# Patient Record
Sex: FEMALE | Race: BLACK OR AFRICAN AMERICAN | NOT HISPANIC OR LATINO | Employment: FULL TIME | URBAN - METROPOLITAN AREA
[De-identification: names, ages, dates, MRNs, and addresses within clinical notes are randomized per-mention and may not be internally consistent; named-entity substitution may affect disease eponyms.]

---

## 2024-06-19 ENCOUNTER — OFFICE VISIT (OUTPATIENT)
Dept: BARIATRICS | Facility: CLINIC | Age: 34
End: 2024-06-19
Payer: COMMERCIAL

## 2024-06-19 VITALS
SYSTOLIC BLOOD PRESSURE: 120 MMHG | WEIGHT: 284 LBS | HEART RATE: 70 BPM | HEIGHT: 62 IN | DIASTOLIC BLOOD PRESSURE: 88 MMHG | BODY MASS INDEX: 52.26 KG/M2

## 2024-06-19 DIAGNOSIS — E66.01 CLASS 3 SEVERE OBESITY DUE TO EXCESS CALORIES WITH SERIOUS COMORBIDITY AND BODY MASS INDEX (BMI) OF 50.0 TO 59.9 IN ADULT (HCC): Primary | ICD-10-CM

## 2024-06-19 PROBLEM — E66.813 CLASS 3 SEVERE OBESITY DUE TO EXCESS CALORIES WITH SERIOUS COMORBIDITY AND BODY MASS INDEX (BMI) OF 50.0 TO 59.9 IN ADULT (HCC): Status: ACTIVE | Noted: 2024-06-19

## 2024-06-19 PROCEDURE — 99204 OFFICE O/P NEW MOD 45 MIN: CPT | Performed by: INTERNAL MEDICINE

## 2024-06-19 RX ORDER — METFORMIN HYDROCHLORIDE 500 MG/1
TABLET, EXTENDED RELEASE ORAL
Qty: 60 TABLET | Refills: 3 | Status: CANCELLED | OUTPATIENT
Start: 2024-06-19

## 2024-06-19 NOTE — ASSESSMENT & PLAN NOTE
Nutrition: Explained the role of being in a calorie deficit to achieve weight loss  -Avoid liquid calories  -Patient reports that she has decreased her snacking and changed her diet with the help of a nutritionist and is  focusing on protein ; encouraged her to continue current efforts in balancing meals but start tracking her current intake  -Patient encouraged to meet with dietitian for nutrition prescription  Patient reports she has to taste foods in small amounts throughout the day as part of her job as a     Physical Activity: Discussed [employer] fitness program with her and encourage patient to incorporate 2 to 3 days of strength training even with resistance bands      Sleep -- STOP- BANG- 3/8 ; 9 hours     Behavioral/Stress: Start tracking current intake using MFP, so adjustments can be made by the dietitian.    Antiobesity Medications/Medical --class III obesity BMI of 52 will be checking labs to evaluate for comorbidities  -Patient has had prior history of PCOS and reports a prior history of prediabetes when labs were checked in 2022  -Discussed initiating metformin as first-line therapy for prediabetes and PCOS after reviewing labs  -Patient primarily interested in injectable medications as she reports she has read Mounjaro is effective for PCOS  -Discussed with patient that medication such as Ozempic and Mounjaro are only approved with the prior diagnosis of diabetes which we will be evaluating for through labs  -Discussed goals oral medications such as Qsymia phentermine (current blood pressure 120/88, heart rate 70 and topiramate)  -Discussed Contrave and its components bupropion and naltrexone no obvious contraindication  -We decided that patient will work with the dietitian for 8 weeks and a calorie deficit diet along with metformin and decisions regarding injectables will be made at follow-up visits  -We will review the labs first prior to prescribing metformin  -Also encouraged the patient to find  a primary care physician in the area to help address underlying concerns and primary care needs

## 2024-06-19 NOTE — PROGRESS NOTES
Assessment/Plan     Austyn Willis is 33 y.o. year old female  who comes in for consultation for assistance with weight management.     - Discussed options of [facility]-Intensive Lifestyle Intervention Program, Very Low Calorie Diet-VLCD, and Conservative Program and the role of weight loss medications.  - Patient is interested in pursuing Conservative Program    Class 3 severe obesity due to excess calories with serious comorbidity and body mass index (BMI) of 50.0 to 59.9 in adult (HCC)  Nutrition: Explained the role of being in a calorie deficit to achieve weight loss  -Avoid liquid calories  -Patient reports that she has decreased her snacking and changed her diet with the help of a nutritionist and is  focusing on protein ; encouraged her to continue current efforts in balancing meals but start tracking her current intake  -Patient encouraged to meet with dietitian for nutrition prescription  Patient reports she has to taste foods in small amounts throughout the day as part of her job as a     Physical Activity: Discussed [employer] fitness program with her and encourage patient to incorporate 2 to 3 days of strength training even with resistance bands      Sleep -- STOP- BANG- 3/8 ; 9 hours     Behavioral/Stress: Start tracking current intake using MFP, so adjustments can be made by the dietitian.    Antiobesity Medications/Medical --class III obesity BMI of 52 will be checking labs to evaluate for comorbidities  -Patient has had prior history of PCOS and reports a prior history of prediabetes when labs were checked in 2022  -Discussed initiating metformin as first-line therapy for prediabetes and PCOS after reviewing labs  -Patient primarily interested in injectable medications as she reports she has read Mounjaro is effective for PCOS  -Discussed with patient that medication such as Ozempic and Mounjaro are only approved with the prior diagnosis of diabetes which we will be evaluating for through  labs  -Discussed goals oral medications such as Qsymia phentermine (current blood pressure 120/88, heart rate 70 and topiramate)  -Discussed Contrave and its components bupropion and naltrexone no obvious contraindication  -We decided that patient will work with the dietitian for 8 weeks and a calorie deficit diet along with metformin and decisions regarding injectables will be made at follow-up visits  -We will review the labs first prior to prescribing metformin  -Also encouraged the patient to find a primary care physician in the area to help address underlying concerns and primary care needs    Austyn was seen today for consult.    Diagnoses and all orders for this visit:    Class 3 severe obesity due to excess calories with serious comorbidity and body mass index (BMI) of 50.0 to 59.9 in adult (HCC)  -     CBC and differential; Future  -     Comprehensive metabolic panel; Future  -     Hemoglobin A1C; Future  -     Insulin, fasting; Future  -     Lipid panel; Future  -     T3, free; Future  -     TSH, 3rd generation with Free T4 reflex; Future          -In addition, please follow general recommendations below.          Return visit:  6-8 weeks        General Lifestyle recommendations:    Nutrition   -Avoid skipping meals. Avoid sugary beverages. At least 64oz of water daily.  Limit processed food, refined sugars and grain. Encourage  healthy choices for meals and snacks   -Focus on protein goals and non starchy fiber rich vegetables for satiety effect and to help support a calorie deficit.   - Emphasize portion control, well balanced macronutrient's (protein, carbohydrate, fat using MyPlate method )and adequate protein with each meal/snacks and distributing calories equally throughout the day along with.   -Advise starting the day with a protein breakfast   Behavioral/Stress   Food log via alex or provided paper log (alex options include www.[employer]nesspal.com, sparkpeople.com, loseit.com, calorieking.com,  "baritastic). Encouraged mindful eating. Be sure to set aside time to eat, eat slowly, and savor your food. Consider meditation apps and/or taking a few minutes of mindfulness every AM. Understand the role of regarding the role of stress hormone cortisol in promoting weight gain and visceral fat accumulation. Weigh daily or atleast 2-3 times/ week  Physical Activity   Increase physical activity by 10 minutes daily. Gradually increase physical activity to a goal of 5 days per week for 30 minutes of MODERATE intensity ( should be able to pass the \"talk test\" but should not be able to sing. Target 150-300 minutes  PLUS 2 days per week of FULL BODY resistance training. Progression will be addressed at follow up visits. Encouraged contemplation regarding establishing a daily physical activity routine  - Resistance training along with increase protein intake is important to maintain and enhance metabolism  Sleep   Encourage sleep hygiene and importance of having adequate sleep duration at least > 6 hours to support response in weight loss efforts    Handouts provided :  [facility]  MyPlate and food quality  Food log resources, phone alex or paper journal  Antiobesity medications options     - Discussed at length and the role of weight loss medications and medication options   - Explained the importance of making lifestyle changes in addition to starting any anti-obesity medications if the  patient chooses.  -  Initial weight loss goal of 5-10% weight loss for improved health  - Weight loss can improve patient's co-morbid conditions and/or prevent weight-related complications.  - Weight is not at goal and patient has been unable to achieve a meaningful weight loss above 5% using various programs and tools for more than 6 months    Austyn was seen today for consult.    Diagnoses and all orders for this visit:    Class 3 severe obesity due to excess calories with serious comorbidity and body mass index " (BMI) of 50.0 to 59.9 in adult (HCC)  -     CBC and differential; Future  -     Comprehensive metabolic panel; Future  -     Hemoglobin A1C; Future  -     Insulin, fasting; Future  -     Lipid panel; Future  -     T3, free; Future  -     TSH, 3rd generation with Free T4 reflex; Future                      Total time spent reviewing chart, interviewing patient, examining patient, discussing plan, answering all questions, and documentin min, with >50% face-to-face time spent counseling patient on nonsurgical interventions for the treatment of excess weight. Discussed in detail nonsurgical options including intensive lifestyle intervention program, very low-calorie diet program and conservative program.  Discussed the role of weight loss medications.  Counseled patient on diet behavior and exercise modification for weight loss.            Lifestyle questionnaire     Meal preps  Diet recall:  B: 2-3 eggs English muffin or bagel or pancake  L: Chicken with corn roast chicken and kidney beans ground lamb brown rice  D: veggie mix rice and beans with meat, P/V/S  S: Greek yogurt with breakfast berries and granola, cheat snacks peanut butter cookies chocolate trail mix  Eating out vs cooking at home- none    Beverages  Water--64 +     Caffeine/tea-- iced tea  sweetened   SSB- apple juice 24 oz    Alcohol: no  Smoking: no  Drug use: smoke 2-3 x/ week    Physical Activity -- new job, [employer] , walking 2-3 miles, previously was on her feet 10-12 hours    Sleep -- STOP- BANG- 3/8 ; 9 hours     Occupation- personal  irregular hours, 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  -Patient recently moved from [place]    Start date: 6/19/24  Intial weight : 284 lbs  Intial BMI: 51.94 kg/m2  Obesity Class: Above 40- Obesity Class III  Goal weight: 225 lbs          History of present illness       Fam hx of obesity- none    Food behaviorssnacking/grazing and struggle controlling portions    Previous Weight loss medications/Weight loss attempts:   3 months ago started to change her diet but is still gaining weight despite stopping snacking-    Patient reports and other history-    Self referred    Wt Readings from Last 20 Encounters:   06/19/24 129 kg (284 lb)           Medication considerations/contraindications     -Patient denies personal history of pancreatitis. Patient also denies personal and family history of medullary thyroid cancer and multiple endocrine neoplasia type 2 (MEN 2 tumor). -Patient denies any history of kidney stones, seizures, or glaucoma, diabetic retinopathy, gall bladder disease, hyperthyroidism.  -Denies Hx of CAD, PAD, palpitations, arrhythmia.   -Denies uncontrolled anxiety or depression, suicidal behavior or thinking , insomnia or sleep disturbance.         Past medical history/past surgical history       Previous notes and records have been reviewed.    The following portions of the patient's history were reviewed and updated as appropriate: allergies, current medications, past family history, past medical history, past social history, past surgical history, and problem list.    Past Medical History:   Diagnosis Date    Hypothyroidism     PCOS (polycystic ovarian syndrome)     Insulin  "resistant         History reviewed. No pertinent surgical history.          History reviewed. No pertinent family history.         Objective     /88 (BP Location: Left arm, Patient Position: Sitting, Cuff Size: Large)   Pulse 70   Ht 5' 2\" (1.575 m)   Wt 129 kg (284 lb)   BMI 51.94 kg/m²       Review of Systems    Physical Exam  Vitals and nursing note reviewed.   Constitutional:       Appearance: Normal appearance.   HENT:      Head: Normocephalic.   Neck:      Thyroid: No thyroid mass, thyromegaly or thyroid tenderness.   Cardiovascular:      Rate and Rhythm: Normal rate and regular rhythm.      Pulses: Normal pulses.      Heart sounds: Normal heart sounds.   Pulmonary:      Effort: Pulmonary effort is normal.      Breath sounds: Normal breath sounds.   Abdominal:      General: Bowel sounds are normal.      Palpations: Abdomen is soft.   Musculoskeletal:      Cervical back: Normal range of motion and neck supple. No tenderness.      Right lower leg: No edema.      Left lower leg: No edema.   Skin:     General: Skin is warm and dry.   Neurological:      General: No focal deficit present.      Mental Status: She is alert and oriented to person, place, and time.   Psychiatric:         Mood and Affect: Mood normal.         Behavior: Behavior normal.         Thought Content: Thought content normal.         Judgment: Judgment normal.            Medications     No current outpatient medications on file.           Labs and imaging     Recent labs and imaging have been personally reviewed.  No results found for: \"WBC\", \"HGB\", \"HCT\", \"MCV\", \"PLT\"  No results found for: \"NA\", \"SODIUM\", \"K\", \"CL\", \"CO2\", \"ANIONGAP\", \"AGAP\", \"BUN\", \"CREATININE\", \"GLUC\", \"GLUF\", \"CALCIUM\", \"AST\", \"ALT\", \"ALKPHOS\", \"PROT\", \"TP\", \"BILITOT\", \"TBILI\", \"EGFR\"  No results found for: \"HGBA1C\"  No results found for: \"BZX6TVXETMDD\", \"TSH\"  No results found for: \"CHOLESTEROL\"  No results found for: \"HDL\"  No results found for: \"TRIG\"  No " "results found for: \"LDLCALC\"                   "

## 2024-06-21 LAB — HBA1C MFR BLD HPLC: 5.9 %

## 2024-07-02 NOTE — PROGRESS NOTES
Weight Management Medical Nutrition Assessment  Austyn is here for Meal Planning. Current Wt 284.9#. Hx of Prediabetes, Insulin resistant PCOS, HS. Waiting on lab results to determine HGA1C and possible prescription for Metformin or possible Semiglutide. Excess calories per patient dietary recall from carbohydrates and sweetened beverages. Due to working long hours as a  7 days a week, experiences long stretches without eating in the day. Discussed importance of consistent intake of carbohydrates and protein. Developed a conservative calorie plan as patient is not interested in tight calorie restriction at this time. Will follow up with RD.  Dislikes: avoids pork    Goals:   1) Reducing sweetened beverage intake from 24 ounces a day to 16 ounces a day.  2) Adding in 2 snacks( balanced with carbohydrates and protein) a day or a balanced lunch meal cut in half between Breakfast and Dinner  3) Looking into strength training program to possibly commence 1-2 days a week    Patient seen by Medical Provider in past 6 months:  yes  Requested to schedule appointment with Medical Provider: No      Anthropometric Measurements  Start Weight (#): 284#(6/19/24)  Current Weight (#): 294.9# (07/08/24)  TBW % Change from start weight:n/a  Ideal Body Weight (#):110#  Goal Weight (#):230#  Highest: 28$#  Lowest: 240#    Weight Loss History  Previous weight loss attempts: Hormones supplementation estrogen.     Food and Nutrition Related History  Wake up: 7-7:30 am   Bed Time:10:30-11 pm    Food Recall- Will go very long stretches without eating  Breakfast: 10-10:30 am no appetite: Greek yogurt with 1/2 cup granola 1/2-3/4 and berries or boiled eggs with smoked salmon. Egg and cheese or bagel .     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 --steps not assessed  Physical Activity -- new job, [employer] , walking 2-3 miles, previously was on her feet 10-12 hours  Frequency:infrequently  Physical limitations/barriers to exercise: busy work schedule    Estimated Needs  Energy  SECA: BMR:n/a      X 1.3 -1000 =  Reggie Simons Energy Needs: BMR : 1956     1-2# loss weekly sedentary:  6891-8991             1-2# loss weekly lightly active:3926-3783  Maintenance calories for sedentary activity level: 2348  Protein:60-75      (1.2-1.5g/kg IBW)  Fluid: 58     (35mL/kg IBW)    Nutrition Diagnosis  Yes;    Overweight/obesity  related to Excess energy intake as evidenced by  BMI more than normative standard for age and sex (obesity-grade III 40+)       Nutrition Intervention    Nutrition Prescription--Pt comfortable with conservative loss  Calories:9432-2155 kcal/day  Protein:75  Fluid:58 ounces    Meal Plan (Bebeto/Pro/Carb)  Breakfast: 300-400kcal/20-25 g protein  Snack:  Lunch: 300-400 kcal/25 g protein OR 2 snacks  Snack:  Dinner: 600 kcal/30-35 g protein  Snack:    Nutrition Education:    Calorie controlled menu  Lean protein food choices  Healthy snack options  Food journaling tips      Nutrition Counseling:  Strategies: meal planning, portion sizes, healthy snack choices, hydration, fiber intake, protein intake, exercise, food journal      Monitoring and Evaluation:  Evaluation criteria:  Energy Intake  Meet protein needs  Maintain adequate hydration  Monitor weekly weight  Meal planning/preparation  Food journal   Decreased portions at mealtimes and snacks  Physical activity     Barriers to learning:none  Readiness to change: Preparation:  (Getting ready to change)    Comprehension: excellent  Expected Compliance: good

## 2024-07-08 ENCOUNTER — CLINICAL SUPPORT (OUTPATIENT)
Dept: BARIATRICS | Facility: CLINIC | Age: 34
End: 2024-07-08

## 2024-07-08 VITALS — HEIGHT: 62 IN | BODY MASS INDEX: 52.43 KG/M2 | WEIGHT: 284.9 LBS

## 2024-07-08 DIAGNOSIS — R63.5 ABNORMAL WEIGHT GAIN: Primary | ICD-10-CM

## 2024-07-08 PROCEDURE — WMDI30

## 2024-07-08 PROCEDURE — RECHECK

## 2024-07-15 ENCOUNTER — TELEPHONE (OUTPATIENT)
Age: 34
End: 2024-07-15

## 2024-07-15 NOTE — TELEPHONE ENCOUNTER
Patient called in to check on whether her labs were received by Dr Solis, she is requesting someone call her to go over them and decide if she is able to go on WM meds.  Please call her at , thanks

## 2024-08-14 ENCOUNTER — OFFICE VISIT (OUTPATIENT)
Dept: BARIATRICS | Facility: CLINIC | Age: 34
End: 2024-08-14
Payer: COMMERCIAL

## 2024-08-14 VITALS
BODY MASS INDEX: 50.79 KG/M2 | SYSTOLIC BLOOD PRESSURE: 110 MMHG | WEIGHT: 276 LBS | HEIGHT: 62 IN | HEART RATE: 64 BPM | DIASTOLIC BLOOD PRESSURE: 78 MMHG

## 2024-08-14 DIAGNOSIS — E66.01 CLASS 3 SEVERE OBESITY DUE TO EXCESS CALORIES WITH SERIOUS COMORBIDITY AND BODY MASS INDEX (BMI) OF 50.0 TO 59.9 IN ADULT (HCC): Primary | ICD-10-CM

## 2024-08-14 PROCEDURE — 99215 OFFICE O/P EST HI 40 MIN: CPT | Performed by: INTERNAL MEDICINE

## 2024-08-14 RX ORDER — METFORMIN HCL 500 MG
TABLET, EXTENDED RELEASE 24 HR ORAL
Qty: 60 TABLET | Refills: 3 | Status: SHIPPED | OUTPATIENT
Start: 2024-08-14

## 2024-08-14 NOTE — ASSESSMENT & PLAN NOTE
"Congratulated patient on making significant lifestyle changes with meal prepping, logging in my fitness pal and prioritizing protein that have resulted in an 8 pound weight loss.  She reports that getting to her protein targets has been the hardest part.  She reports that cravings are improved and does not want to implement any processed foods such as meal replacement shakes given her occupation as a .  She is trying to implement resistance training.  She does walk 2 to 3 miles at work.  She will follow nutrition prescription provided by dietitian calories:0856-7959 kcal/day  Protein:75  Fluid:58 ounces  Class III obesity with hyperlipidemia and prediabetes  Patient has not yet inquired with her insurance regarding coverage for injectable medications but states that she is not sure if she wants to try injections.  She reports that she is not looking for rapid weight loss but more slow and sustainable.  Patient reports that she is looking for \"30 to 50 pounds of weight loss\".  Given her recent blood work showing prediabetes with a hemoglobin A1c of 5.9, we decided to start with metformin at 500 mg twice a day.  We could consider agents such as a combination of phentermine and Topamax to simulate Qsymia or bupropion and naltrexone to simulate Contrave at future visits as she has no contraindications.   "

## 2024-08-14 NOTE — PROGRESS NOTES
"  Program: [facility]    Assessment/Plan     Austyn Willis  is a 33 y.o. female with  returns to follow up  for treatment of excess body weight and associated risk factors/co-morbidities.     Class 3 severe obesity due to excess calories with serious comorbidity and body mass index (BMI) of 50.0 to 59.9 in adult ([facility])  Congratulated patient on making significant lifestyle changes with meal prepping, logging in my fitness pal and prioritizing protein that have resulted in an 8 pound weight loss.  She reports that getting to her protein targets has been the hardest part.  She reports that cravings are improved and does not want to implement any processed foods such as meal replacement shakes given her occupation as a .  She is trying to implement resistance training.  She does walk 2 to 3 miles at work.  She will follow nutrition prescription provided by dietitian calories:9519-6120 kcal/day  Protein:75  Fluid:58 ounces  Class III obesity with hyperlipidemia and prediabetes  Patient has not yet inquired with her insurance regarding coverage for injectable medications but states that she is not sure if she wants to try injections.  She reports that she is not looking for rapid weight loss but more slow and sustainable.  Patient reports that she is looking for \"30 to 50 pounds of weight loss\".  Given her recent blood work showing prediabetes with a hemoglobin A1c of 5.9, we decided to start with metformin at 500 mg twice a day.  We could consider agents such as a combination of phentermine and Topamax to simulate Qsymia or bupropion and naltrexone to simulate Contrave at future visits as she has no contraindications.        Most recent notes and records were reviewed.         Return visit:  2-3 months     Nutrition   Do not skip meals. Avoid sugary beverages. At least 64oz of water daily.    Behavioral/Stress   Food log via alex or provided paper log (alex options include www.broadbandchoices.com, sparkpeople.com, " "loseit.com, Koru.com, [employer]). Encouraged mindful eating    Physical Activity  Increase physical activity by 10 minutes daily. Gradually increase physical activity to a goal of 5 days per week for 30 minutes of MODERATE intensity ( ( should be able to pass the \"talk test\" but should not be able to sing.  target 150-300 minutes  PLUS 2-3 days per week of FULL BODY resistance training. Progression will be addressed at follow up visits. Encouraged planning regarding establishing physical activity routine    Sleep  Provided sleep hygiene counseling and importance of having adequate sleep duration          Eric was seen today for follow-up.    Diagnoses and all orders for this visit:    Class 3 severe obesity due to excess calories with serious comorbidity and body mass index (BMI) of 50.0 to 59.9 in adult ([facility])  -     metFORMIN (GLUCOPHAGE-XR) 500 mg 24 hr tablet; Start with 500 mg orally once daily with the evening meal increase in two weeks to 1000 mg (two tablets) if tolerated                Total time spent reviewing chart, interviewing patient, examining patient, discussing plan, answering all questions, and documentin minutes with >50% face-to-face time with the patient.            Weight trajectory     Wt Readings from Last 20 Encounters:   24 125 kg (276 lb)   24 129 kg (284 lb 14.4 oz)   24 129 kg (284 lb)               Weight/ Lifestyle history/HPI     Patient reports   Calories:1094-9964 kcal/day  Protein:75  Fluid:58 ounces  Has been incorporating more protein  Diet recall:  B: frittatas   L: Chicken salad wrap chickpea quinoa salad and turkey burger  D: salmon rice baked chicken   S: apples PB granola Greek yogurt blueberries with coconut  Eating out vs cooking at home- none     Beverages  Water--64 +     Caffeine/tea-- iced tea  sweetened   SSB- apple juice 24 oz     Alcohol: no  Smoking: no  Drug use: smoke 2-3 x/ week     Physical Activity -- new job, [employer] , " "walking 2-3 miles, previously was on her feet 10-12 hours     Sleep -- STOP- BANG- 3/8 ; 9 hours      Occupation- personal  irregular hours, 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     Psycho social- lives by herself     Gyneac (Menopausal status/periods/contraception)- never had a period , and carries a diagnosis of PCOS since age 16,     Start date: 6/19/24  Intial weight : 284 lbs  Intial BMI: 51.94 kg/m2  Obesity Class: Above 40- Obesity Class III  Goal weight: 225 lbs    Current Weight on 8/14/2024: 276 lbs  Current BMI : 50 kg/m2 Above 40- Obesity Class III  Difference: -8 lbs      Anti obesity Medications/ Medical---    Weight loss medication and dose: None  Date initiated:               Objective         The following portions of the patient's history were reviewed and updated as appropriate: allergies, current medications, past family history, past medical history, past social history, past surgical history, and problem list.      /78   Pulse 64   Ht 5' 2\" (1.575 m)   Wt 125 kg (276 lb)   BMI 50.48 kg/m²             Review of Systems   Constitutional:  Negative for fatigue.   HENT:  Negative for sore throat.    Respiratory:  Negative for cough and shortness of breath.    Cardiovascular:  Negative for chest pain, palpitations and leg swelling.   Gastrointestinal:  Negative for abdominal pain, constipation, diarrhea and nausea.   Genitourinary:  Negative for dysuria.   Musculoskeletal:  Negative for arthralgias and back pain.   Skin:  Negative for rash.   Neurological:  Negative for headaches.   Psychiatric/Behavioral:  Negative for dysphoric mood. The patient is not nervous/anxious.          Physical Exam  Vitals and nursing note reviewed.   Constitutional:       Appearance: Normal appearance.   HENT:      Head: Normocephalic.   Pulmonary:      Effort: Pulmonary effort is normal.   Neurological:      General: No focal deficit present.      Mental Status: She is alert and " "oriented to person, place, and time.   Psychiatric:         Mood and Affect: Mood normal.         Behavior: Behavior normal.         Thought Content: Thought content normal.         Judgment: Judgment normal.          Current medications       Current Outpatient Medications:     metFORMIN (GLUCOPHAGE-XR) 500 mg 24 hr tablet, Start with 500 mg orally once daily with the evening meal increase in two weeks to 1000 mg (two tablets) if tolerated, Disp: 60 tablet, Rfl: 3         Labs     Most recent labs reviewed   No results found for: \"NA\", \"SODIUM\", \"K\", \"CL\", \"CO2\", \"ANIONGAP\", \"AGAP\", \"BUN\", \"CREATININE\", \"GLUC\", \"GLUF\", \"CALCIUM\", \"AST\", \"ALT\", \"ALKPHOS\", \"PROT\", \"TP\", \"BILITOT\", \"TBILI\", \"EGFR\"  Lab Results   Component Value Date    Psychiatric 5.9 06/21/2024     No results found for: \"OGW1BFCOKPTM\", \"TSH\"  No results found for: \"CHOLESTEROL\"  No results found for: \"HDL\"  No results found for: \"TRIG\"  No results found for: \"LDLCALC\"  No results found for: \"VITD\"  No components found for: \"FASTINS\"   "

## 2024-12-10 DIAGNOSIS — E66.01 CLASS 3 SEVERE OBESITY DUE TO EXCESS CALORIES WITH SERIOUS COMORBIDITY AND BODY MASS INDEX (BMI) OF 50.0 TO 59.9 IN ADULT (HCC): ICD-10-CM

## 2024-12-10 DIAGNOSIS — E66.813 CLASS 3 SEVERE OBESITY DUE TO EXCESS CALORIES WITH SERIOUS COMORBIDITY AND BODY MASS INDEX (BMI) OF 50.0 TO 59.9 IN ADULT (HCC): ICD-10-CM

## 2024-12-10 RX ORDER — METFORMIN HYDROCHLORIDE 500 MG/1
TABLET, EXTENDED RELEASE ORAL
Qty: 180 TABLET | Refills: 3 | Status: SHIPPED | OUTPATIENT
Start: 2024-12-10

## 2024-12-10 RX ORDER — METFORMIN HYDROCHLORIDE 500 MG/1
TABLET, EXTENDED RELEASE ORAL
Qty: 60 TABLET | Refills: 3 | Status: CANCELLED | OUTPATIENT
Start: 2024-12-10

## 2025-03-10 ENCOUNTER — TELEPHONE (OUTPATIENT)
Dept: BARIATRICS | Facility: CLINIC | Age: 35
End: 2025-03-10

## 2025-03-10 DIAGNOSIS — E66.813 CLASS 3 SEVERE OBESITY DUE TO EXCESS CALORIES WITH SERIOUS COMORBIDITY AND BODY MASS INDEX (BMI) OF 50.0 TO 59.9 IN ADULT (HCC): ICD-10-CM

## 2025-03-10 DIAGNOSIS — E66.01 CLASS 3 SEVERE OBESITY DUE TO EXCESS CALORIES WITH SERIOUS COMORBIDITY AND BODY MASS INDEX (BMI) OF 50.0 TO 59.9 IN ADULT (HCC): ICD-10-CM

## 2025-03-10 RX ORDER — METFORMIN HYDROCHLORIDE 500 MG/1
TABLET, EXTENDED RELEASE ORAL
Qty: 60 TABLET | Refills: 0 | Status: SHIPPED | OUTPATIENT
Start: 2025-03-10

## 2025-03-10 NOTE — TELEPHONE ENCOUNTER
Fax received from [facility] requesting  Refill.    Quantity: 60  Refills: 3

## 2025-03-26 ENCOUNTER — TELEPHONE (OUTPATIENT)
Dept: BARIATRICS | Facility: CLINIC | Age: 35
End: 2025-03-26

## 2025-03-26 NOTE — TELEPHONE ENCOUNTER
Called several times to have the patient call and set up a f/up appt. W/  no answer so I  Lmsg, patient had several appt.'s in which she cxd and no showed.

## 2025-06-24 ENCOUNTER — APPOINTMENT (OUTPATIENT)
Dept: URBAN - METROPOLITAN AREA CLINIC 374 | Facility: CLINIC | Age: 35
Setting detail: DERMATOLOGY
End: 2025-06-24

## 2025-06-24 DIAGNOSIS — L21.8 OTHER SEBORRHEIC DERMATITIS: ICD-10-CM | Status: STABLE

## 2025-06-24 PROCEDURE — ? ADDITIONAL NOTES

## 2025-06-24 PROCEDURE — ? DEFER

## 2025-06-24 PROCEDURE — ? COUNSELING

## 2025-06-24 ASSESSMENT — LOCATION ZONE DERM: LOCATION ZONE: SCALP

## 2025-06-24 ASSESSMENT — LOCATION DETAILED DESCRIPTION DERM: LOCATION DETAILED: LEFT MEDIAL FRONTAL SCALP

## 2025-06-24 ASSESSMENT — LOCATION SIMPLE DESCRIPTION DERM: LOCATION SIMPLE: LEFT SCALP

## 2025-06-24 ASSESSMENT — SEVERITY ASSESSMENT: HOW SEVERE IS THIS PATIENT'S CONDITION?: MILD

## 2025-06-24 NOTE — HPI: HAIR LOSS
How Did The Hair Loss Occur?: gradual in onset
How Severe Is Your Hair Loss?: mild
Additional History: Patient has PCOS

## 2025-06-24 NOTE — PROCEDURE: ADDITIONAL NOTES
Render Risk Assessment In Note?: no
Detail Level: Simple
Additional Notes: Pt's main concern is hair thinning at the vertex of the scalp. However, one exam, there is adequate hair growth and fullness. She notes that the hair here is growing out shorter than the rest of her hair. This is apparent on exam. Discussed with patient that since she has adequate hair growth and that there is no discrete hair loss noted today that we can try treating for seborrheic dermatitis (seb derm) to help decrease inflammation in the scalp to see if this can help her hair grow in healthier. Pt agreeable to treat for seb derm on her own and we will follow up in 6 months to re-assess.